# Patient Record
Sex: MALE | Race: WHITE | HISPANIC OR LATINO | Employment: FULL TIME | URBAN - METROPOLITAN AREA
[De-identification: names, ages, dates, MRNs, and addresses within clinical notes are randomized per-mention and may not be internally consistent; named-entity substitution may affect disease eponyms.]

---

## 2019-01-11 PROBLEM — J34.3 HYPERTROPHY OF NASAL TURBINATES: Status: ACTIVE | Noted: 2019-01-11

## 2019-01-11 PROBLEM — G47.33 OBSTRUCTIVE SLEEP APNEA: Status: ACTIVE | Noted: 2019-01-11

## 2019-01-11 PROBLEM — J34.2 DEVIATED NASAL SEPTUM: Status: ACTIVE | Noted: 2019-01-11

## 2019-01-11 PROBLEM — J34.89 NASAL OBSTRUCTION: Status: ACTIVE | Noted: 2019-01-11

## 2022-05-03 ENCOUNTER — OFFICE VISIT (OUTPATIENT)
Dept: PODIATRY | Facility: CLINIC | Age: 67
End: 2022-05-03
Payer: OTHER GOVERNMENT

## 2022-05-03 VITALS — HEIGHT: 65 IN | BODY MASS INDEX: 40.66 KG/M2 | WEIGHT: 244.06 LBS

## 2022-05-03 DIAGNOSIS — G57.92 PERIPHERAL NEURITIS OF LEFT FOOT: ICD-10-CM

## 2022-05-03 DIAGNOSIS — G57.91 PERIPHERAL NEURITIS OF RIGHT FOOT: Primary | ICD-10-CM

## 2022-05-03 DIAGNOSIS — E66.01 MORBID OBESITY: ICD-10-CM

## 2022-05-03 PROCEDURE — 99204 OFFICE O/P NEW MOD 45 MIN: CPT | Mod: S$PBB,,, | Performed by: PODIATRIST

## 2022-05-03 PROCEDURE — 99999 PR PBB SHADOW E&M-NEW PATIENT-LVL II: CPT | Mod: PBBFAC,,, | Performed by: PODIATRIST

## 2022-05-03 PROCEDURE — 99999 PR PBB SHADOW E&M-NEW PATIENT-LVL II: ICD-10-PCS | Mod: PBBFAC,,, | Performed by: PODIATRIST

## 2022-05-03 PROCEDURE — 99202 OFFICE O/P NEW SF 15 MIN: CPT | Mod: PBBFAC | Performed by: PODIATRIST

## 2022-05-03 PROCEDURE — 99204 PR OFFICE/OUTPT VISIT, NEW, LEVL IV, 45-59 MIN: ICD-10-PCS | Mod: S$PBB,,, | Performed by: PODIATRIST

## 2022-05-03 RX ORDER — GABAPENTIN 300 MG/1
300 CAPSULE ORAL 2 TIMES DAILY
Qty: 60 CAPSULE | Refills: 0 | Status: SHIPPED | OUTPATIENT
Start: 2022-05-03 | End: 2022-06-07

## 2022-05-03 NOTE — PROGRESS NOTES
Subjective:       Patient ID: Jean Paul Hodges is a 66 y.o. male.    Chief Complaint: Foot Pain (8/10 B/L pain at present to plantar foot. C/o burning and itching. Non [facility] )    HPI: Jean Paul Hodges presents to the clinic today with the complaint of persistent burning and tingling to the B/L lower extremity. Patient states these symptoms have been on going now for the past several months, and are worsening. Patient states the symptoms seem to be moreso nocturnal. Pain/Discomfort is rated at approx. 8/10. Pain/Discomfort is described as an irritant, but is occasionally sharp and shooting like. Patient does not have a history of lumbosacral pathology; spinal stenosis, radiculopathy, sciatica. Patient is not a DMI or DMII. Patient is not currently on Gabapentin, or Lyrica or Cymbalta and/or etc... Patient states no trauma. No recent EMG/NCV is stated. Was seen by Neurology prior and as per the note, not neuropathy was noted. Was following with Dr. Wilder Sherman prior for neuropathy and metatarsalgia.     Review of patient's allergies indicates:   Allergen Reactions    Hydrocodone Itching    Other omega-3s Rash     Patient cannot take any pain medicine.        Past Medical History:   Diagnosis Date    Borderline diabetic     HTN (hypertension)     Hyperlipidemia        Family History   Problem Relation Age of Onset    Diabetes Neg Hx     Heart disease Neg Hx        Social History     Socioeconomic History    Marital status:    Tobacco Use    Smoking status: Never Smoker       Past Surgical History:   Procedure Laterality Date    BACK SURGERY      KNEE SURGERY Left     x 3    SINUS SURGERY      Septo       Review of Systems   Constitutional: Negative for chills, fatigue and fever.   HENT: Negative for hearing loss.    Eyes: Negative for photophobia and visual disturbance.   Respiratory: Negative for cough, chest tightness, shortness of breath and wheezing.   "  Cardiovascular: Negative for chest pain and palpitations.   Gastrointestinal: Negative for constipation, diarrhea, nausea and vomiting.   Endocrine: Negative for cold intolerance and heat intolerance.   Genitourinary: Negative for flank pain.   Musculoskeletal: Negative for neck pain and neck stiffness.   Neurological: Positive for numbness. Negative for light-headedness and headaches.        Neuritis    Psychiatric/Behavioral: Negative for sleep disturbance.          Objective:   Ht 5' 5" (1.651 m)   Wt 110.7 kg (244 lb 0.8 oz)   BMI 40.61 kg/m²     Physical Exam    LOWER EXTREMITY PHYSICAL EXAMINATION  DERMATOLOGY: Skin is supple, dry and intact.     ORTHOPEDIC: Manual Muscle Testing is 5/5 in all planes on the B/L LE, without pains, with and without resistance. Rectus foot type is noted. Non-antalgic gait.    NEUROLOGY: Proprioception is intact. Sensation to light touch is intact. Protective sensation is intact via 5.07 Froid Ayala monofilament. Straight leg raise is negative. Negative Tinel's Sign and negative Valleix Sign. No neurological sensations with compression of the area of Lewis's Nerve in the area of the Abductor Hallucis muscle belly. Upon palpation of the interspaces, there are no neurological sensations stated that radiate proximal or distal. Upon compression of the metatarsal heads from medial to lateral, no neurological sensations or symptoms are stated. Deep tendon reflexes to the lower extremity are WNL.    Assessment:     1. Peripheral neuritis of right foot    2. Peripheral neuritis of left foot    3. Morbid obesity        Plan:     Peripheral neuritis of right foot  -     gabapentin (NEURONTIN) 300 MG capsule; Take 1 capsule (300 mg total) by mouth 2 (two) times daily.  Dispense: 60 capsule; Refill: 0    Peripheral neuritis of left foot  -     gabapentin (NEURONTIN) 300 MG capsule; Take 1 capsule (300 mg total) by mouth 2 (two) times daily.  Dispense: 60 capsule; Refill: 0    Morbid " obesity      Thorough discussion is had with the patient today, concerning the diagnosis, its etiology, and the treatment algorithm at present.     Patient's past medical history is thoroughly reviewed today with the patient and/or family members. Complete chart review is performed at this time.    Start Gabapentin 300mg BID for now.    Patient is educated as to the use and the effectiveness of Gabapentin, as well as the potential side effects, which may include, but is not limited to, mood or behavior changes, anxiety, depression, feelings of agitation or hostility or restlessness, hyperactive (mentally or physically), thoughts of suicide or hurting oneself, increased seizures, fever, swollen glands, body aches, flu symptoms, drowsiness, skin rash, easy bruising or bleeding, severe tingling, numbness, pain, muscle weakness, upper stomach pain, loss of appetite, dark urine, jaundice (yellowing of the skin or eyes), chest pain, irregular heart rhythm, feeling short of breath, confusion, nausea and vomiting, swelling, rapid weight gain, urinating less than usual or not at all, new or worsening cough, fever, trouble breathing, rapid back and forth movement of your eyes. With the initial dosage, please take at night prior to bedtime until getting used to its potential drowsy effect.    Patient is counseled and reminded concerning the importance of good nutrition and healthy eating habits, which may include blood sugar control, to prevent and/or help podiatric foot and ankle complications.          Future Appointments   Date Time Provider Department Center   5/3/2022  9:30 AM Hudson Claudio DPM [facility]

## 2022-06-07 ENCOUNTER — OFFICE VISIT (OUTPATIENT)
Dept: PODIATRY | Facility: CLINIC | Age: 67
End: 2022-06-07
Payer: OTHER GOVERNMENT

## 2022-06-07 VITALS — WEIGHT: 244.06 LBS | BODY MASS INDEX: 40.66 KG/M2 | HEIGHT: 65 IN

## 2022-06-07 DIAGNOSIS — G57.92 PERIPHERAL NEURITIS OF LEFT FOOT: ICD-10-CM

## 2022-06-07 DIAGNOSIS — G57.91 PERIPHERAL NEURITIS OF RIGHT FOOT: Primary | ICD-10-CM

## 2022-06-07 DIAGNOSIS — E66.01 MORBID OBESITY: ICD-10-CM

## 2022-06-07 PROCEDURE — 99213 OFFICE O/P EST LOW 20 MIN: CPT | Mod: PBBFAC | Performed by: PODIATRIST

## 2022-06-07 PROCEDURE — 99999 PR PBB SHADOW E&M-EST. PATIENT-LVL III: CPT | Mod: PBBFAC,,, | Performed by: PODIATRIST

## 2022-06-07 PROCEDURE — 99214 PR OFFICE/OUTPT VISIT, EST, LEVL IV, 30-39 MIN: ICD-10-PCS | Mod: S$PBB,,, | Performed by: PODIATRIST

## 2022-06-07 PROCEDURE — 99999 PR PBB SHADOW E&M-EST. PATIENT-LVL III: ICD-10-PCS | Mod: PBBFAC,,, | Performed by: PODIATRIST

## 2022-06-07 PROCEDURE — 99214 OFFICE O/P EST MOD 30 MIN: CPT | Mod: S$PBB,,, | Performed by: PODIATRIST

## 2022-06-07 RX ORDER — GABAPENTIN 600 MG/1
600 TABLET ORAL 2 TIMES DAILY
Qty: 60 TABLET | Refills: 0 | Status: SHIPPED | OUTPATIENT
Start: 2022-06-07 | End: 2022-07-07

## 2022-06-07 NOTE — PROGRESS NOTES
Subjective:       Patient ID: Jean Paul Hodges is a 66 y.o. male.    Chief Complaint: Foot Pain (10/10 B/L pain at present to plantar foot. C/o burning, itching and swelling around ankles. Non diabetic [facility] )    HPI: Jean Paul Hodges presents to the clinic today for follow up concerning persistent burning and tingling to the B/L lower extremity. Patient states these symptoms have been on going now for the past several months. Did see Neurology prior, but neuropathy was not felt to be the prevailing Dx. Pain/Discomfort is rated at approx. 10/10. Pain/Discomfort is described as an irritant, but is occasionally sharp and shooting like. Patient does not have a history of lumbosacral pathology; spinal stenosis, radiculopathy, sciatica.  Patient was started on 300mg BID of Gabapentin prior. He states mild relief. Patient states no trauma. No recent EMG/NCV is stated. Was seen by     Review of patient's allergies indicates:   Allergen Reactions    Hydrocodone Itching    Other omega-3s Rash     Patient cannot take any pain medicine.        Past Medical History:   Diagnosis Date    Borderline diabetic     HTN (hypertension)     Hyperlipidemia        Family History   Problem Relation Age of Onset    Diabetes Neg Hx     Heart disease Neg Hx        Social History     Socioeconomic History    Marital status:    Tobacco Use    Smoking status: Never Smoker       Past Surgical History:   Procedure Laterality Date    BACK SURGERY      KNEE SURGERY Left     x 3    SINUS SURGERY      Septo       Review of Systems   Constitutional: Negative for chills, fatigue and fever.   HENT: Negative for hearing loss.    Eyes: Negative for photophobia and visual disturbance.   Respiratory: Negative for cough, chest tightness, shortness of breath and wheezing.    Cardiovascular: Negative for chest pain and palpitations.   Gastrointestinal: Negative for constipation, diarrhea, nausea and vomiting.   Endocrine:  "Negative for cold intolerance and heat intolerance.   Genitourinary: Negative for flank pain.   Musculoskeletal: Negative for neck pain and neck stiffness.   Neurological: Positive for numbness. Negative for light-headedness and headaches.        Neuritis    Psychiatric/Behavioral: Negative for sleep disturbance.          Objective:   Ht 5' 5" (1.651 m)   Wt 110.7 kg (244 lb 0.8 oz)   BMI 40.61 kg/m²     Physical Exam    LOWER EXTREMITY PHYSICAL EXAMINATION  DERMATOLOGY: Skin is supple, dry and intact.     ORTHOPEDIC: Manual Muscle Testing is 5/5 in all planes on the B/L LE, without pains, with and without resistance. Rectus foot type is noted. Non-antalgic gait.    NEUROLOGY: Proprioception is intact. Sensation to light touch is intact. Protective sensation is intact via 5.07 Frank[place]muth Ayala monofilament. Straight leg raise is negative. Negative Tinel's Sign and negative Valleix Sign. No neurological sensations with compression of the area of Lewis's Nerve in the area of the Abductor Hallucis muscle belly. Upon palpation of the interspaces, there are no neurological sensations stated that radiate proximal or distal. Upon compression of the metatarsal heads from medial to lateral, no neurological sensations or symptoms are stated. Deep tendon reflexes to the lower extremity are WNL.    Assessment:     1. Peripheral neuritis of right foot    2. Peripheral neuritis of left foot    3. Morbid obesity        Plan:     Peripheral neuritis of right foot  -     Nerve conduction test; Future; Expected date: 06/08/2022  -     gabapentin (NEURONTIN) 600 MG tablet; Take 1 tablet (600 mg total) by mouth 2 (two) times daily.  Dispense: 60 tablet; Refill: 0    Peripheral neuritis of left foot  -     Nerve conduction test; Future; Expected date: 06/08/2022  -     gabapentin (NEURONTIN) 600 MG tablet; Take 1 tablet (600 mg total) by mouth 2 (two) times daily.  Dispense: 60 tablet; Refill: 0    Morbid obesity      Thorough " discussion is had with the patient today, concerning the diagnosis, its etiology, and the treatment algorithm at present.     Continue Gabapentin for now, but increase to 600mg BID.    Patient is educated as to the use and the effectiveness of Gabapentin, as well as the potential side effects, which may include, but is not limited to, mood or behavior changes, anxiety, depression, feelings of agitation or hostility or restlessness, hyperactive (mentally or physically), thoughts of suicide or hurting oneself, increased seizures, fever, swollen glands, body aches, flu symptoms, drowsiness, skin rash, easy bruising or bleeding, severe tingling, numbness, pain, muscle weakness, upper stomach pain, loss of appetite, dark urine, jaundice (yellowing of the skin or eyes), chest pain, irregular heart rhythm, feeling short of breath, confusion, nausea and vomiting, swelling, rapid weight gain, urinating less than usual or not at all, new or worsening cough, fever, trouble breathing, rapid back and forth movement of your eyes. With the initial dosage, please take at night prior to bedtime until getting used to its potential drowsy effect.    Will obtain EMG NCV due to recalcitrant pathology.        No future appointments.

## 2022-07-15 ENCOUNTER — OFFICE VISIT (OUTPATIENT)
Dept: PHYSICAL MEDICINE AND REHAB | Facility: CLINIC | Age: 67
End: 2022-07-15
Payer: OTHER GOVERNMENT

## 2022-07-15 VITALS
WEIGHT: 244 LBS | DIASTOLIC BLOOD PRESSURE: 100 MMHG | BODY MASS INDEX: 40.65 KG/M2 | RESPIRATION RATE: 14 BRPM | HEIGHT: 65 IN | HEART RATE: 80 BPM | SYSTOLIC BLOOD PRESSURE: 190 MMHG

## 2022-07-15 DIAGNOSIS — M54.16 LUMBAR RADICULOPATHY: ICD-10-CM

## 2022-07-15 PROCEDURE — 99999 PR PBB SHADOW E&M-EST. PATIENT-LVL III: CPT | Mod: PBBFAC,,, | Performed by: PHYSICAL MEDICINE & REHABILITATION

## 2022-07-15 PROCEDURE — 95908 NRV CNDJ TST 3-4 STUDIES: CPT | Mod: PBBFAC | Performed by: PHYSICAL MEDICINE & REHABILITATION

## 2022-07-15 PROCEDURE — 99999 PR PBB SHADOW E&M-EST. PATIENT-LVL III: ICD-10-PCS | Mod: PBBFAC,,, | Performed by: PHYSICAL MEDICINE & REHABILITATION

## 2022-07-15 PROCEDURE — 99203 PR OFFICE/OUTPT VISIT, NEW, LEVL III, 30-44 MIN: ICD-10-PCS | Mod: 25,S$PBB,, | Performed by: PHYSICAL MEDICINE & REHABILITATION

## 2022-07-15 PROCEDURE — 95886 PR EMG COMPLETE, W/ NERVE CONDUCTION STUDIES, 5+ MUSCLES: ICD-10-PCS | Mod: 26,S$PBB,, | Performed by: PHYSICAL MEDICINE & REHABILITATION

## 2022-07-15 PROCEDURE — 95908 NRV CNDJ TST 3-4 STUDIES: CPT | Mod: 26,S$PBB,, | Performed by: PHYSICAL MEDICINE & REHABILITATION

## 2022-07-15 PROCEDURE — 99203 OFFICE O/P NEW LOW 30 MIN: CPT | Mod: 25,S$PBB,, | Performed by: PHYSICAL MEDICINE & REHABILITATION

## 2022-07-15 PROCEDURE — 95886 MUSC TEST DONE W/N TEST COMP: CPT | Mod: PBBFAC | Performed by: PHYSICAL MEDICINE & REHABILITATION

## 2022-07-15 PROCEDURE — 95886 MUSC TEST DONE W/N TEST COMP: CPT | Mod: 26,S$PBB,, | Performed by: PHYSICAL MEDICINE & REHABILITATION

## 2022-07-15 PROCEDURE — 99213 OFFICE O/P EST LOW 20 MIN: CPT | Mod: PBBFAC | Performed by: PHYSICAL MEDICINE & REHABILITATION

## 2022-07-15 PROCEDURE — 95908 PR NERVE CONDUCTION STUDY; 3-4 STUDIES: ICD-10-PCS | Mod: 26,S$PBB,, | Performed by: PHYSICAL MEDICINE & REHABILITATION

## 2022-07-15 NOTE — PROGRESS NOTES
[facility]   [address]  [place] [address]  Phone: [phone]        Full Name: Jean Paul Hodges YOB: 1955  Patient ID: [id number]      Visit Date: 7/15/2022 13:36  Age: 66 Years 11 Months Old  Examining Physician: Niki Berman M.D.  Referring Physician:   Reason for Referral: le pain    Chief Complaint   Patient presents with    Back Pain     Into legs-left slightly worse than right       HPI: This is a 66 y.o.  male being seen in clinic today for evaluation of lower ext/foot numbness/tingling -worse on the left today.  He has a history of lumbar surgery years ago, but denies any recent back pain.  He has noticed worsening of pain radiating down his lateral right leg now.  Gabapentin has provided some relief.     History obtained from patient    Past family, medical, social, and surgical history reviewed in chart    Review of Systems:     General- denies lethargy, weight change, fever, chills  Head/neck- denies swallowing difficulties  ENT- denies hearing changes  Cardiovascular-denies chest pain  Pulmonary- denies shortness of breath  GI- denies constipation or bowel incontinence  - denies bladder incontinence  Skin- denies wounds or rashes  Musculoskeletal- denies weakness, +pain  Neurologic- +numbness and tingling  Psychiatric- denies depressive or psychotic features, denies anxiety  Lymphatic-denies swelling  Endocrine- denies hypoglycemic symptoms/DM history  All other pertinent systems negative     Physical Examination:  General: Well developed, well nourished male, NAD  HEENT:NCAT EOMI bilaterally   Pulmonary:Normal respirations    Spinal Examination: CERVICAL  Active ROM is within normal limits.  Inspection: No deformity of spinal alignment.      Spinal Examination: LUMBAR or THORACIC  Active ROM is limited at endranges  Inspection: No deformity of spinal alignment.    Palpation: No vertebral tenderness to percussion.      Bilateral Upper and Lower  Extremities:  Pulses are 2+ at radial bilaterally.  Shoulder/Elbow/Wrist/Hand ROM   Hip/Knee/Ankle ROM wnl  Bilateral Extremities show normal capillary refill.  No signs of cyanosis, rubor, edema, skin changes, or dysvascular changes of appendages.  Nails appear intact.    Neurological Exam:  Cranial Nerves:  II-XII grossly intact    Manual Muscle Testing: (Motor 5=normal)  5/5 strength bilateral lower extremities    No focal atrophy is noted of either lower extremity.    Bilateral Reflexes:.  No clonus at knee or ankle.    Sensation: tested to light touch  - intact in legs    Gait: Narrow base and good arm swing.      Entire procedure explained to patient prior to proceeding.  Verbal consent obtained      SNC      Nerve / Sites Rec. Site Onset Lat Peak Lat Amp Segments Distance Velocity     ms ms µV  mm m/s   L Sural - Ankle (Calf)      Calf Ankle 2.0 2.9 13.3 Calf - Ankle 140 69   L Superficial peroneal - Ankle      1  2.5 3.1 9.0 1 - G1 140 56       MNC      Nerve / Sites Muscle Latency Amplitude Duration Rel Amp Segments Distance Lat Diff Velocity     ms mV ms %  mm ms m/s   L Peroneal - EDB      Ankle EDB 4.1 2.7 5.7 100 Ankle - EDB 80        Fib head EDB 10.1 2.8 7.0 104 Fib head - Ankle 280 6.0 46      Pop fossa EDB 11.7 2.1 6.1 73.2 Pop fossa - Fib head 70 1.6 43         Pop fossa - Ankle  7.7    L Tibial - AH      Ankle AH 4.1 6.2 3.8 100 Ankle - AH 80        Pop fossa AH 12.9 4.2 6.5 67.9 Pop fossa - Ankle 380 8.9 43       EMG      Muscle   Dur. Ampl. Phases Turns Area SizeIdx SpikeDur Rate Rise     ms µV   µVms  ms Hz µs   L GENERIC MUSCLE Mean ALL 35.7 938 16 36 3972 4.18 0.7 9 387    Sd ALL 0.0 0.00 0 0 0 0 0 0 0    Mean SIMPLE             7.1 35.7 938 16 36 3972 4.18 0.7 9 387          EMG Summary Table     Spontaneous MUAP Recruitment   Muscle IA Fib PSW Fasc Other Dur. Dur Amp Dur Polys Pattern Effort   L. Tibialis anterior N None None None .   N N N N Max   L. Gastrocnemius (Medial head) Incr None  1+ None .   N Sl Incr 1+ sl red Max   L. Extensor digitorum brevis N None None None .   Incr N N sl red Max   L. Rectus femoris N None None None .   N N N N Max   R. Extensor digitorum brevis Incr 1+ 1+ None .   N N 1+ Reduced Max   R. Gastrocnemius (Medial head) Incr 1+ None None .   N N 1+ sl red Max                             INTERPRETATION  -Left superficial peroneal sensory nerve conduction study showed normal peak latency and amplitude  -Left sural sensory nerve conduction study showed normal peak latency and amplitude  -Left peroneal motor nerve conduction study showed normal latency, amplitude, and conduction velocity  -Left tibial motor nerve conduction study showed normal latency, amplitude, and conduction velocity  -Needle EMG examination performed to above mentioned muscles       IMPRESSION  1. ABNORMAL Study  2. There Is electrodiagnostic evidence of an acute on chronic radiculopathy of the bilateral S1 and right L5 nerve roots and a chronic radiculopathy of the left L5 nerve root    PLAN  Discussed in detail for greater than 30 minutes about diagnosis and treatment plan    1. Follow up with referring provider: Dr. Hudson Claudio  2. Handouts on lumbar radic provided. Rec Lspine imaging (h/o back surgery), referral to IPM and PT for further eval and tx.  3. This study is good for one year. If symptoms worsen or do not improve, please re-consult.    Niki Berman M.D.  Physical Medicine and Rehab

## 2022-10-10 ENCOUNTER — PATIENT MESSAGE (OUTPATIENT)
Dept: RESEARCH | Facility: HOSPITAL | Age: 67
End: 2022-10-10
Payer: OTHER GOVERNMENT

## 2023-05-23 ENCOUNTER — PATIENT MESSAGE (OUTPATIENT)
Dept: RESEARCH | Facility: HOSPITAL | Age: 68
End: 2023-05-23
Payer: OTHER GOVERNMENT